# Patient Record
Sex: MALE | Race: WHITE | NOT HISPANIC OR LATINO | URBAN - METROPOLITAN AREA
[De-identification: names, ages, dates, MRNs, and addresses within clinical notes are randomized per-mention and may not be internally consistent; named-entity substitution may affect disease eponyms.]

---

## 2022-06-02 ENCOUNTER — OFFICE (OUTPATIENT)
Dept: URBAN - METROPOLITAN AREA CLINIC 34 | Facility: CLINIC | Age: 77
End: 2022-06-02
Payer: OTHER GOVERNMENT

## 2022-06-02 VITALS
WEIGHT: 197 LBS | HEIGHT: 73 IN | DIASTOLIC BLOOD PRESSURE: 76 MMHG | TEMPERATURE: 97.8 F | SYSTOLIC BLOOD PRESSURE: 150 MMHG

## 2022-06-02 DIAGNOSIS — Z12.11 ENCOUNTER FOR SCREENING FOR MALIGNANT NEOPLASM OF COLON: ICD-10-CM

## 2022-06-02 DIAGNOSIS — K40.90 UNILATERAL INGUINAL HERNIA, WITHOUT OBSTRUCTION OR GANGRENE,: ICD-10-CM

## 2022-06-02 PROCEDURE — 99203 OFFICE O/P NEW LOW 30 MIN: CPT

## 2022-06-02 NOTE — SERVICEHPINOTES
78 yo male present for colonoscopy consultation referred by Dr. Miles. 
br

No blood in stool, No abd pain. No lost weight. Once a day BM, rated type 4 at [facility] chart. No N/V/D. br Pt reports that he has scheduled for hernia repair surgery on 06/20/2022. He is planning on go to [place] on 09/09/22. 
br He had Pneumonia a couple of weeks ago and almost finished ABX. No SOB. No wheezing or fever. 
brHx of gallbladder removal. brLast colonoscopy 2012: Recall 10 years . No Family hx of colon CA.  No heart or lung issues. No blood thinner. br
br

## 2022-06-17 ENCOUNTER — OFFICE (OUTPATIENT)
Dept: URBAN - METROPOLITAN AREA CLINIC 34 | Facility: CLINIC | Age: 77
End: 2022-06-17

## 2022-06-17 PROCEDURE — 00031: CPT | Performed by: INTERNAL MEDICINE

## 2022-08-22 ENCOUNTER — ON CAMPUS - OUTPATIENT (OUTPATIENT)
Dept: URBAN - METROPOLITAN AREA HOSPITAL 16 | Facility: HOSPITAL | Age: 77
End: 2022-08-22
Payer: OTHER GOVERNMENT

## 2022-08-22 DIAGNOSIS — K63.5 POLYP OF COLON: ICD-10-CM

## 2022-08-22 DIAGNOSIS — Z12.11 ENCOUNTER FOR SCREENING FOR MALIGNANT NEOPLASM OF COLON: ICD-10-CM

## 2022-08-22 PROCEDURE — 45380 COLONOSCOPY AND BIOPSY: CPT | Mod: PT | Performed by: INTERNAL MEDICINE
